# Patient Record
Sex: MALE | Race: WHITE | NOT HISPANIC OR LATINO | Employment: OTHER | URBAN - NONMETROPOLITAN AREA
[De-identification: names, ages, dates, MRNs, and addresses within clinical notes are randomized per-mention and may not be internally consistent; named-entity substitution may affect disease eponyms.]

---

## 2023-11-14 DIAGNOSIS — R97.20 ELEVATED PSA: Primary | ICD-10-CM

## 2023-12-12 ASSESSMENT — ENCOUNTER SYMPTOMS
FEVER: 0
ALLERGIC/IMMUNOLOGIC NEGATIVE: 1
CHILLS: 0
EYES NEGATIVE: 1
NAUSEA: 0
ENDOCRINE NEGATIVE: 1
DIFFICULTY URINATING: 0
COUGH: 0
SHORTNESS OF BREATH: 0
PSYCHIATRIC NEGATIVE: 1

## 2023-12-12 NOTE — PROGRESS NOTES
Subjective   Patient ID: Urban Wright is a 68 y.o. male.    HPI  Patient is here for 6 month for hx of elevated PSA. Most recent PSA was 10.80 on 12/23. Prior PSA was   12.70 on 6/23. Prior PSA was 9.2 (5/22) Previous PSA was 12.80 on 10/21.. Baseline PSA is around 10...Normal MRI on 10/21...Pt has had two negative bx in the past..Fhx of prostate ca(brother, father)..Denies bone..Denies weight loss/gain...LUTS are chronic and stable..Some frequency..Denies urgency..Denies dysuria..Denies hematuria..Nocturia x 1-2x..He is taking DDAVP. Most recent Sodium is 135 on 5/23. . .ED is not a concern..Patient is taking Isosorbide.       Review of Systems   Constitutional:  Negative for chills and fever.   HENT: Negative.     Eyes: Negative.    Respiratory:  Negative for cough and shortness of breath.    Cardiovascular:  Negative for chest pain and leg swelling.   Gastrointestinal:  Negative for nausea.   Endocrine: Negative.    Genitourinary:  Negative for difficulty urinating.        Negative except for documented in HPI   Allergic/Immunologic: Negative.    Neurological:         Alert & oriented X 3   Hematological:         Denies blood thinners   Psychiatric/Behavioral: Negative.         Objective   Physical Exam  Vitals and nursing note reviewed.   Constitutional:       General: He is not in acute distress.     Appearance: Normal appearance.   Pulmonary:      Effort: Pulmonary effort is normal.   Abdominal:      Tenderness: There is no abdominal tenderness.   Genitourinary:     Comments: Kidneys non palpable bilaterally  Bladder non palpable or tender  Scrotum no mass, No hydrocele  Epididymis- No spermatocele. Non Tender.  Testicles: No mass  Urethra: No discharge  Penis within normal limits... No lesions. Circumcised  Prostate - slight calcification midline os slightly to right of midline.  no nodules  Seminal Vesicals: No mass.  Sphincter tone: normal  Neurological:      Mental Status: He is alert.          Assessment/Plan     Diagnoses and all orders for this visit:  Benign prostatic hyperplasia with lower urinary tract symptoms, symptom details unspecified  Elevated PSA  Nocturia      All available PSA values reviewed, Options discussed. Questions answered.  Past MRI and Bx reviewed  Dicsussed repeat MRI   Diet changes for prostate health discussed and educational information given. Pros/Cons of prostate health supplements discussed.   Treatment options for LUTS reviewed  DDAVP Rx refilled  BMP ordered  Discussed timed voiding. Discussed fluid and caffeine intake  Treatment options for ED reviewed.  Lifestyle change to help prevent UTIs discussed. Encouraged fluid intake.    F/U with BMP Virtual

## 2023-12-13 ENCOUNTER — OFFICE VISIT (OUTPATIENT)
Dept: UROLOGY | Facility: CLINIC | Age: 68
End: 2023-12-13
Payer: MEDICARE

## 2023-12-13 VITALS — WEIGHT: 289 LBS | BODY MASS INDEX: 37.11 KG/M2 | RESPIRATION RATE: 16 BRPM

## 2023-12-13 DIAGNOSIS — R35.1 NOCTURIA: ICD-10-CM

## 2023-12-13 DIAGNOSIS — R97.20 ELEVATED PSA: ICD-10-CM

## 2023-12-13 DIAGNOSIS — N40.1 BENIGN PROSTATIC HYPERPLASIA WITH LOWER URINARY TRACT SYMPTOMS, SYMPTOM DETAILS UNSPECIFIED: ICD-10-CM

## 2023-12-13 PROCEDURE — 4004F PT TOBACCO SCREEN RCVD TLK: CPT | Performed by: UROLOGY

## 2023-12-13 PROCEDURE — 99214 OFFICE O/P EST MOD 30 MIN: CPT | Performed by: UROLOGY

## 2023-12-13 RX ORDER — LISINOPRIL AND HYDROCHLOROTHIAZIDE 10; 12.5 MG/1; MG/1
1 TABLET ORAL
COMMUNITY
Start: 2021-06-29 | End: 2024-06-09

## 2023-12-13 RX ORDER — IBUPROFEN 100 MG/5ML
1000 SUSPENSION, ORAL (FINAL DOSE FORM) ORAL
COMMUNITY

## 2023-12-13 RX ORDER — ASPIRIN 81 MG/1
81 TABLET ORAL
COMMUNITY

## 2023-12-13 RX ORDER — GABAPENTIN 300 MG/1
600 CAPSULE ORAL 2 TIMES DAILY
COMMUNITY
Start: 2023-10-02 | End: 2023-12-31

## 2023-12-13 RX ORDER — IBUPROFEN 200 MG
CAPSULE ORAL
COMMUNITY
Start: 2023-08-21

## 2023-12-13 RX ORDER — ISOSORBIDE MONONITRATE 30 MG/1
30 TABLET, EXTENDED RELEASE ORAL
COMMUNITY
Start: 2021-06-29 | End: 2024-06-09

## 2023-12-13 RX ORDER — DESMOPRESSIN ACETATE 0.1 MG/1
100 TABLET ORAL NIGHTLY
COMMUNITY

## 2023-12-13 RX ORDER — CLOPIDOGREL BISULFATE 75 MG/1
75 TABLET ORAL DAILY
COMMUNITY

## 2023-12-13 RX ORDER — DULOXETIN HYDROCHLORIDE 30 MG/1
30 CAPSULE, DELAYED RELEASE ORAL DAILY
COMMUNITY

## 2023-12-13 RX ORDER — METFORMIN HYDROCHLORIDE 500 MG/1
500 TABLET ORAL
COMMUNITY
Start: 2021-06-02 | End: 2024-03-30

## 2023-12-13 RX ORDER — ATORVASTATIN CALCIUM 40 MG/1
40 TABLET, FILM COATED ORAL
COMMUNITY
Start: 2023-10-26

## 2023-12-13 RX ORDER — BLOOD-GLUCOSE METER
KIT MISCELLANEOUS
COMMUNITY

## 2023-12-13 RX ORDER — PANTOPRAZOLE SODIUM 40 MG/1
1 TABLET, DELAYED RELEASE ORAL DAILY
COMMUNITY
Start: 2021-04-30

## 2023-12-13 RX ORDER — METOPROLOL SUCCINATE 25 MG/1
25 TABLET, EXTENDED RELEASE ORAL
COMMUNITY
Start: 2021-06-29 | End: 2024-06-09

## 2024-01-30 ASSESSMENT — ENCOUNTER SYMPTOMS
FEVER: 0
EYES NEGATIVE: 1
ALLERGIC/IMMUNOLOGIC NEGATIVE: 1
NAUSEA: 0
COUGH: 0
DIFFICULTY URINATING: 0
SHORTNESS OF BREATH: 0
ENDOCRINE NEGATIVE: 1
CHILLS: 0
PSYCHIATRIC NEGATIVE: 1

## 2024-01-30 NOTE — PROGRESS NOTES
Subjective   Patient ID: Urban Wright is a 68 y.o. male.    HPI  Patient is here for BMP results. He is taking DDAVP and finds this helpful. Recent Sodium was 140 on 1/24. Prior Sodium was 135 on 5/23. hx of elevated PSA. Most recent PSA was 10.80 on 12/23. Prior PSA was   12.70 on 6/23. Prior PSA was 9.2 (5/22) Previous PSA was 12.80 on 10/21.. Baseline PSA is around 10...Normal MRI on 10/21...Pt has had two negative bx in the past..Fhx of prostate ca(brother, father)..Denies bone..Denies weight loss/gain...L .ED is not a concern..Patient is taking Isosorbide.        Review of Systems   Constitutional:  Negative for chills and fever.   HENT: Negative.     Eyes: Negative.    Respiratory:  Negative for cough and shortness of breath.    Cardiovascular:  Negative for chest pain and leg swelling.   Gastrointestinal:  Negative for nausea.   Endocrine: Negative.    Genitourinary:  Negative for difficulty urinating.        Negative except for documented in HPI   Allergic/Immunologic: Negative.    Neurological:         Alert & oriented X 3   Hematological:         Denies blood thinners   Psychiatric/Behavioral: Negative.         Objective   Physical Exam  No PE done given the virtual nature of visit.   Assessment/Plan       Diagnoses and all orders for this visit:  Benign prostatic hyperplasia with lower urinary tract symptoms, symptom details unspecified  Elevated PSA  Nocturia      All available PSA values reviewed, Options discussed. Questions answered.   Diet changes for prostate health discussed and educational information given. Pros/Cons of prostate health supplements discussed.   Treatment options for LUTS reviewed  DDAVP refilled  BMP reviewed  Discussed timed voiding. Discussed fluid and caffeine intake  Treatment options for ED reviewed.  Lifestyle change to help prevent UTIs discussed. Encouraged fluid intake.    F/U with PSA and BMP

## 2024-01-31 ENCOUNTER — TELEMEDICINE (OUTPATIENT)
Dept: UROLOGY | Facility: CLINIC | Age: 69
End: 2024-01-31
Payer: MEDICARE

## 2024-01-31 DIAGNOSIS — R97.20 ELEVATED PSA: ICD-10-CM

## 2024-01-31 DIAGNOSIS — R35.1 NOCTURIA: ICD-10-CM

## 2024-01-31 DIAGNOSIS — N40.1 BENIGN PROSTATIC HYPERPLASIA WITH LOWER URINARY TRACT SYMPTOMS, SYMPTOM DETAILS UNSPECIFIED: ICD-10-CM

## 2024-01-31 PROCEDURE — 99213 OFFICE O/P EST LOW 20 MIN: CPT | Performed by: UROLOGY

## 2024-02-13 ASSESSMENT — ENCOUNTER SYMPTOMS
DIFFICULTY URINATING: 0
ALLERGIC/IMMUNOLOGIC NEGATIVE: 1
PSYCHIATRIC NEGATIVE: 1
SHORTNESS OF BREATH: 0
EYES NEGATIVE: 1
CHILLS: 0
COUGH: 0
ENDOCRINE NEGATIVE: 1
NAUSEA: 0
FEVER: 0

## 2024-02-13 NOTE — PROGRESS NOTES
Virtual or Telephone Consent    A telephone visit (audio only) between the patient (at the originating site) and the provider (at the distant site) was utilized to provide this telehealth service.   Verbal consent was requested and obtained from Urban Wright on this date, 02/14/24 for a telehealth visit. Subjective   Patient ID: Urban Wright is a 68 y.o. male.    HPI  Patient is here for 1 month follow up with labs. Hx of elevated PSA. Most recent PSA was 11.70 on 2/24. 10.80 on 12/23. Prior PSA was   12.70 on 6/23. Prior PSA was 9.2 (5/22) Previous PSA was 12.80 on 10/21.. Baseline PSA is around 10...Normal MRI on 10/21...Pt has had two negative bx in the past..Fhx of prostate ca(brother, father)..Denies bone..Denies weight loss/gain.. Chronic BPH sx are mild and stable. Denies urgency and frequency. Denies dysuria. Denies hematuria. Nocturia x1. He is taking DDAVP. Recent Sodium was 138 on 2/24. Prior Sodium was 140 on 1/24. ED is not a concern..Patient is taking Isosorbide.         Review of Systems   Constitutional:  Negative for chills and fever.   HENT: Negative.     Eyes: Negative.    Respiratory:  Negative for cough and shortness of breath.    Cardiovascular:  Negative for chest pain and leg swelling.   Gastrointestinal:  Negative for nausea.   Endocrine: Negative.    Genitourinary:  Negative for difficulty urinating.        Negative except for documented in HPI   Allergic/Immunologic: Negative.    Neurological:         Alert & oriented X 3   Hematological:         Denies blood thinners   Psychiatric/Behavioral: Negative.         Objective   Physical Exam  No PE done given the virtual nature of visit.   Assessment/Plan   Diagnoses and all orders for this visit:  Elevated PSA  Nocturia  Benign prostatic hyperplasia with lower urinary tract symptoms, symptom details unspecified    All available PSA values reviewed, Options discussed. Questions answered.  Past MRI reviewed  Pros and cons of prostate biopsy  reviewed. Other options discussed. Questions answered   Diet changes for prostate health discussed and educational information given. Pros/Cons of prostate health supplements discussed.   Treatment options for LUTS reviewed  Continue DDAVP  BMP reviewed  Discussed timed voiding. Discussed fluid and caffeine intake  Treatment options for ED reviewed.  Lifestyle change to help prevent UTIs discussed. Encouraged fluid intake.    F/U with PSA and BMP 6 months

## 2024-02-14 ENCOUNTER — TELEMEDICINE (OUTPATIENT)
Dept: UROLOGY | Facility: CLINIC | Age: 69
End: 2024-02-14
Payer: MEDICARE

## 2024-02-14 DIAGNOSIS — N40.1 BENIGN PROSTATIC HYPERPLASIA WITH LOWER URINARY TRACT SYMPTOMS, SYMPTOM DETAILS UNSPECIFIED: ICD-10-CM

## 2024-02-14 DIAGNOSIS — R97.20 ELEVATED PSA: ICD-10-CM

## 2024-02-14 DIAGNOSIS — R35.1 NOCTURIA: ICD-10-CM

## 2024-02-14 PROCEDURE — 1159F MED LIST DOCD IN RCRD: CPT | Performed by: UROLOGY

## 2024-02-14 PROCEDURE — 99442 PR PHYS/QHP TELEPHONE EVALUATION 11-20 MIN: CPT | Performed by: UROLOGY

## 2024-07-16 DIAGNOSIS — R35.1 NOCTURIA: Primary | ICD-10-CM

## 2024-07-17 RX ORDER — DESMOPRESSIN ACETATE 0.1 MG/1
100 TABLET ORAL NIGHTLY
Qty: 90 TABLET | Refills: 0 | Status: SHIPPED | OUTPATIENT
Start: 2024-07-17

## 2024-09-05 ASSESSMENT — ENCOUNTER SYMPTOMS
CHILLS: 0
ALLERGIC/IMMUNOLOGIC NEGATIVE: 1
PSYCHIATRIC NEGATIVE: 1
DIFFICULTY URINATING: 0
ENDOCRINE NEGATIVE: 1
SHORTNESS OF BREATH: 0
NAUSEA: 0
COUGH: 0
EYES NEGATIVE: 1
FEVER: 0

## 2024-09-05 NOTE — PROGRESS NOTES
Subjective   Patient ID: Urban Wright is a 69 y.o. male.    HPI   Hx of elevated PSA. Most recent PSA was 11.2 9/24. Prior PSA was 11.70 on 2/24. 10.80 on 12/23. Prior PSA was   12.70 on 6/23. Prior PSA was 9.2 (5/22) Previous PSA was 12.80 on 10/21.. Baseline PSA is around 10...Normal MRI on 10/21...Pt has had two negative bx in the past..Fhx of prostate ca(brother, father)..Denies bone..Denies weight loss/gain.. Chronic BPH sx are moderate and stable. Has some urgency and frequency. Running water makes this worse. Denies dysuria. Denies hematuria. Nocturia x1. He is taking DDAVP. Recent Sodium was 141 on 9/24. Prior Sodium was 140 on 1/24. ED is not a concern..Patient is taking Isosorbide.         Review of Systems   Constitutional:  Negative for chills and fever.   HENT: Negative.     Eyes: Negative.    Respiratory:  Negative for cough and shortness of breath.    Cardiovascular:  Negative for chest pain and leg swelling.   Gastrointestinal:  Negative for nausea.   Endocrine: Negative.    Genitourinary:  Negative for difficulty urinating.        Negative except for documented in HPI   Allergic/Immunologic: Negative.    Neurological:         Alert & oriented X 3   Hematological:         Denies blood thinners   Psychiatric/Behavioral: Negative.         Objective   Physical Exam  Vitals and nursing note reviewed.   Constitutional:       General: He is not in acute distress.     Appearance: Normal appearance.   Pulmonary:      Effort: Pulmonary effort is normal.   Abdominal:      Tenderness: There is no abdominal tenderness.   Genitourinary:     Comments: Kidneys non palpable bilaterally  Bladder non palpable or tender  Scrotum no mass, No hydrocele  Epididymis- No spermatocele. Non Tender.  Testicles: No mass. WNL  Urethra: No discharge  Penis within normal limits... No lesions. circumcised  Prostate - symmetric, no nodules. BENIGN  Seminal Vesicals: No mass.  Sphincter tone: normal  Neurological:      Mental Status:  He is alert.         Assessment/Plan   Diagnoses and all orders for this visit:  Benign prostatic hyperplasia with lower urinary tract symptoms, symptom details unspecified  Nocturia  Elevated PSA    All available PSA values reviewed, Options discussed. Questions answered.  Past MRI reviewed-Discussed repeat MRI  Pros and cons of prostate biopsy reviewed. Other options discussed. Questions answered   Diet changes for prostate health discussed and educational information given. Pros/Cons of prostate health supplements discussed.   Treatment options for LUTS reviewed  DDAVP RX REFILLED  Discussed timed voiding. Discussed fluid and caffeine intake  Treatment options for ED reviewed-observe. On NTG  Lifestyle change to help prevent UTIs discussed. Encouraged fluid intake.    F/U 6 months with BMP and PSA

## 2024-09-11 ENCOUNTER — APPOINTMENT (OUTPATIENT)
Dept: UROLOGY | Facility: CLINIC | Age: 69
End: 2024-09-11
Payer: MEDICARE

## 2024-09-11 VITALS
RESPIRATION RATE: 18 BRPM | SYSTOLIC BLOOD PRESSURE: 128 MMHG | BODY MASS INDEX: 36.85 KG/M2 | DIASTOLIC BLOOD PRESSURE: 80 MMHG | WEIGHT: 287 LBS

## 2024-09-11 DIAGNOSIS — R35.1 NOCTURIA: ICD-10-CM

## 2024-09-11 DIAGNOSIS — R97.20 ELEVATED PSA: ICD-10-CM

## 2024-09-11 DIAGNOSIS — N40.1 BENIGN PROSTATIC HYPERPLASIA WITH LOWER URINARY TRACT SYMPTOMS, SYMPTOM DETAILS UNSPECIFIED: ICD-10-CM

## 2024-09-11 PROCEDURE — 99214 OFFICE O/P EST MOD 30 MIN: CPT | Performed by: UROLOGY

## 2024-09-11 PROCEDURE — 1159F MED LIST DOCD IN RCRD: CPT | Performed by: UROLOGY

## 2024-10-14 DIAGNOSIS — R35.1 NOCTURIA: ICD-10-CM

## 2024-10-14 RX ORDER — DESMOPRESSIN ACETATE 0.1 MG/1
100 TABLET ORAL NIGHTLY
Qty: 90 TABLET | Refills: 0 | Status: SHIPPED | OUTPATIENT
Start: 2024-10-14

## 2025-01-11 DIAGNOSIS — R35.1 NOCTURIA: ICD-10-CM

## 2025-01-14 RX ORDER — DESMOPRESSIN ACETATE 0.1 MG/1
100 TABLET ORAL NIGHTLY
Qty: 90 TABLET | Refills: 0 | Status: SHIPPED | OUTPATIENT
Start: 2025-01-14

## 2025-04-10 DIAGNOSIS — R35.1 NOCTURIA: ICD-10-CM

## 2025-04-16 RX ORDER — DESMOPRESSIN ACETATE 0.1 MG/1
100 TABLET ORAL NIGHTLY
Qty: 90 TABLET | Refills: 0 | Status: SHIPPED | OUTPATIENT
Start: 2025-04-16